# Patient Record
Sex: FEMALE | Race: WHITE | NOT HISPANIC OR LATINO | Employment: PART TIME | URBAN - METROPOLITAN AREA
[De-identification: names, ages, dates, MRNs, and addresses within clinical notes are randomized per-mention and may not be internally consistent; named-entity substitution may affect disease eponyms.]

---

## 2022-03-20 ENCOUNTER — APPOINTMENT (OUTPATIENT)
Dept: RADIOLOGY | Facility: MEDICAL CENTER | Age: 22
End: 2022-03-20
Attending: EMERGENCY MEDICINE
Payer: MEDICAID

## 2022-03-20 ENCOUNTER — APPOINTMENT (OUTPATIENT)
Dept: RADIOLOGY | Facility: MEDICAL CENTER | Age: 22
End: 2022-03-20
Attending: SURGERY
Payer: MEDICAID

## 2022-03-20 ENCOUNTER — HOSPITAL ENCOUNTER (EMERGENCY)
Facility: MEDICAL CENTER | Age: 22
End: 2022-03-20
Attending: EMERGENCY MEDICINE
Payer: MEDICAID

## 2022-03-20 VITALS
OXYGEN SATURATION: 94 % | BODY MASS INDEX: 22.1 KG/M2 | WEIGHT: 117.06 LBS | RESPIRATION RATE: 16 BRPM | TEMPERATURE: 98.1 F | SYSTOLIC BLOOD PRESSURE: 105 MMHG | HEIGHT: 61 IN | DIASTOLIC BLOOD PRESSURE: 59 MMHG | HEART RATE: 75 BPM

## 2022-03-20 DIAGNOSIS — R10.30 LOWER ABDOMINAL PAIN: ICD-10-CM

## 2022-03-20 LAB
ALBUMIN SERPL BCP-MCNC: 5.6 G/DL (ref 3.2–4.9)
ALBUMIN/GLOB SERPL: 1.7 G/DL
ALP SERPL-CCNC: 67 U/L (ref 30–99)
ALT SERPL-CCNC: 18 U/L (ref 2–50)
ANION GAP SERPL CALC-SCNC: 17 MMOL/L (ref 7–16)
APPEARANCE UR: CLEAR
AST SERPL-CCNC: 18 U/L (ref 12–45)
BACTERIA #/AREA URNS HPF: ABNORMAL /HPF
BASOPHILS # BLD AUTO: 0.4 % (ref 0–1.8)
BASOPHILS # BLD: 0.07 K/UL (ref 0–0.12)
BILIRUB SERPL-MCNC: 0.4 MG/DL (ref 0.1–1.5)
BILIRUB UR QL STRIP.AUTO: NEGATIVE
BUN SERPL-MCNC: 15 MG/DL (ref 8–22)
CALCIUM SERPL-MCNC: 10.8 MG/DL (ref 8.5–10.5)
CHLORIDE SERPL-SCNC: 102 MMOL/L (ref 96–112)
CO2 SERPL-SCNC: 19 MMOL/L (ref 20–33)
COLOR UR: YELLOW
CREAT SERPL-MCNC: 0.81 MG/DL (ref 0.5–1.4)
EOSINOPHIL # BLD AUTO: 0.02 K/UL (ref 0–0.51)
EOSINOPHIL NFR BLD: 0.1 % (ref 0–6.9)
EPI CELLS #/AREA URNS HPF: NEGATIVE /HPF
ERYTHROCYTE [DISTWIDTH] IN BLOOD BY AUTOMATED COUNT: 44.4 FL (ref 35.9–50)
GFR SERPLBLD CREATININE-BSD FMLA CKD-EPI: 106 ML/MIN/1.73 M 2
GLOBULIN SER CALC-MCNC: 3.3 G/DL (ref 1.9–3.5)
GLUCOSE SERPL-MCNC: 113 MG/DL (ref 65–99)
GLUCOSE UR STRIP.AUTO-MCNC: NEGATIVE MG/DL
HCG SERPL QL: NEGATIVE
HCT VFR BLD AUTO: 45.8 % (ref 37–47)
HGB BLD-MCNC: 15.2 G/DL (ref 12–16)
HYALINE CASTS #/AREA URNS LPF: ABNORMAL /LPF
IMM GRANULOCYTES # BLD AUTO: 0.07 K/UL (ref 0–0.11)
IMM GRANULOCYTES NFR BLD AUTO: 0.4 % (ref 0–0.9)
KETONES UR STRIP.AUTO-MCNC: 15 MG/DL
LEUKOCYTE ESTERASE UR QL STRIP.AUTO: ABNORMAL
LIPASE SERPL-CCNC: 34 U/L (ref 11–82)
LYMPHOCYTES # BLD AUTO: 1.67 K/UL (ref 1–4.8)
LYMPHOCYTES NFR BLD: 9.5 % (ref 22–41)
MCH RBC QN AUTO: 30 PG (ref 27–33)
MCHC RBC AUTO-ENTMCNC: 33.2 G/DL (ref 33.6–35)
MCV RBC AUTO: 90.3 FL (ref 81.4–97.8)
MICRO URNS: ABNORMAL
MONOCYTES # BLD AUTO: 0.73 K/UL (ref 0–0.85)
MONOCYTES NFR BLD AUTO: 4.2 % (ref 0–13.4)
NEUTROPHILS # BLD AUTO: 14.97 K/UL (ref 2–7.15)
NEUTROPHILS NFR BLD: 85.4 % (ref 44–72)
NITRITE UR QL STRIP.AUTO: NEGATIVE
NRBC # BLD AUTO: 0 K/UL
NRBC BLD-RTO: 0 /100 WBC
PH UR STRIP.AUTO: 6.5 [PH] (ref 5–8)
PLATELET # BLD AUTO: 346 K/UL (ref 164–446)
PMV BLD AUTO: 9.8 FL (ref 9–12.9)
POTASSIUM SERPL-SCNC: 3.8 MMOL/L (ref 3.6–5.5)
PROT SERPL-MCNC: 8.9 G/DL (ref 6–8.2)
PROT UR QL STRIP: NEGATIVE MG/DL
RBC # BLD AUTO: 5.07 M/UL (ref 4.2–5.4)
RBC # URNS HPF: ABNORMAL /HPF
RBC UR QL AUTO: ABNORMAL
SARS-COV+SARS-COV-2 AG RESP QL IA.RAPID: NOTDETECTED
SODIUM SERPL-SCNC: 138 MMOL/L (ref 135–145)
SP GR UR STRIP.AUTO: >=1.045
SPECIMEN SOURCE: NORMAL
UROBILINOGEN UR STRIP.AUTO-MCNC: 0.2 MG/DL
WBC # BLD AUTO: 17.5 K/UL (ref 4.8–10.8)
WBC #/AREA URNS HPF: ABNORMAL /HPF

## 2022-03-20 PROCEDURE — 700105 HCHG RX REV CODE 258: Performed by: EMERGENCY MEDICINE

## 2022-03-20 PROCEDURE — 36415 COLL VENOUS BLD VENIPUNCTURE: CPT

## 2022-03-20 PROCEDURE — 87426 SARSCOV CORONAVIRUS AG IA: CPT

## 2022-03-20 PROCEDURE — 85025 COMPLETE CBC W/AUTO DIFF WBC: CPT

## 2022-03-20 PROCEDURE — 96375 TX/PRO/DX INJ NEW DRUG ADDON: CPT

## 2022-03-20 PROCEDURE — 96376 TX/PRO/DX INJ SAME DRUG ADON: CPT

## 2022-03-20 PROCEDURE — 76705 ECHO EXAM OF ABDOMEN: CPT

## 2022-03-20 PROCEDURE — 74177 CT ABD & PELVIS W/CONTRAST: CPT

## 2022-03-20 PROCEDURE — 700117 HCHG RX CONTRAST REV CODE 255: Performed by: EMERGENCY MEDICINE

## 2022-03-20 PROCEDURE — 99243 OFF/OP CNSLTJ NEW/EST LOW 30: CPT | Performed by: SURGERY

## 2022-03-20 PROCEDURE — 80053 COMPREHEN METABOLIC PANEL: CPT

## 2022-03-20 PROCEDURE — 81001 URINALYSIS AUTO W/SCOPE: CPT

## 2022-03-20 PROCEDURE — 99285 EMERGENCY DEPT VISIT HI MDM: CPT

## 2022-03-20 PROCEDURE — 700111 HCHG RX REV CODE 636 W/ 250 OVERRIDE (IP): Performed by: EMERGENCY MEDICINE

## 2022-03-20 PROCEDURE — 83690 ASSAY OF LIPASE: CPT

## 2022-03-20 PROCEDURE — 84703 CHORIONIC GONADOTROPIN ASSAY: CPT

## 2022-03-20 PROCEDURE — 76856 US EXAM PELVIC COMPLETE: CPT

## 2022-03-20 PROCEDURE — 96374 THER/PROPH/DIAG INJ IV PUSH: CPT

## 2022-03-20 RX ORDER — SODIUM CHLORIDE, SODIUM LACTATE, POTASSIUM CHLORIDE, CALCIUM CHLORIDE 600; 310; 30; 20 MG/100ML; MG/100ML; MG/100ML; MG/100ML
1000 INJECTION, SOLUTION INTRAVENOUS ONCE
Status: COMPLETED | OUTPATIENT
Start: 2022-03-20 | End: 2022-03-20

## 2022-03-20 RX ORDER — KETOROLAC TROMETHAMINE 30 MG/ML
15 INJECTION, SOLUTION INTRAMUSCULAR; INTRAVENOUS ONCE
Status: COMPLETED | OUTPATIENT
Start: 2022-03-20 | End: 2022-03-20

## 2022-03-20 RX ORDER — ONDANSETRON 2 MG/ML
4 INJECTION INTRAMUSCULAR; INTRAVENOUS ONCE
Status: COMPLETED | OUTPATIENT
Start: 2022-03-20 | End: 2022-03-20

## 2022-03-20 RX ORDER — IBUPROFEN 800 MG/1
800 TABLET ORAL EVERY 8 HOURS PRN
COMMUNITY

## 2022-03-20 RX ORDER — HYDROXYZINE HYDROCHLORIDE 25 MG/1
25 TABLET, FILM COATED ORAL
COMMUNITY

## 2022-03-20 RX ORDER — NITROFURANTOIN 25; 75 MG/1; MG/1
100 CAPSULE ORAL 2 TIMES DAILY
Qty: 14 CAPSULE | Refills: 0 | Status: SHIPPED | OUTPATIENT
Start: 2022-03-20 | End: 2022-03-27

## 2022-03-20 RX ORDER — KETOROLAC TROMETHAMINE 30 MG/ML
30 INJECTION, SOLUTION INTRAMUSCULAR; INTRAVENOUS ONCE
Status: COMPLETED | OUTPATIENT
Start: 2022-03-20 | End: 2022-03-20

## 2022-03-20 RX ORDER — ONDANSETRON 4 MG/1
4 TABLET, ORALLY DISINTEGRATING ORAL EVERY 6 HOURS PRN
COMMUNITY

## 2022-03-20 RX ADMIN — KETOROLAC TROMETHAMINE 15 MG: 30 INJECTION, SOLUTION INTRAMUSCULAR at 08:51

## 2022-03-20 RX ADMIN — IOHEXOL 100 ML: 350 INJECTION, SOLUTION INTRAVENOUS at 06:13

## 2022-03-20 RX ADMIN — ONDANSETRON 4 MG: 2 INJECTION INTRAMUSCULAR; INTRAVENOUS at 05:00

## 2022-03-20 RX ADMIN — KETOROLAC TROMETHAMINE 30 MG: 30 INJECTION, SOLUTION INTRAMUSCULAR at 05:35

## 2022-03-20 RX ADMIN — SODIUM CHLORIDE, POTASSIUM CHLORIDE, SODIUM LACTATE AND CALCIUM CHLORIDE 1000 ML: 600; 310; 30; 20 INJECTION, SOLUTION INTRAVENOUS at 05:03

## 2022-03-20 ASSESSMENT — PAIN DESCRIPTION - PAIN TYPE: TYPE: ACUTE PAIN

## 2022-03-20 NOTE — ED NOTES
Med Rec complete per Pt and family at bedside.  Allergies reviewed.    Home Pharmacy:  [facility]

## 2022-03-20 NOTE — ED PROVIDER NOTES
"ED Provider Note    CHIEF COMPLAINT  Chief Complaint   Patient presents with   • Abdominal Pain         [facility]  Sahraa Dorothy Samuels is a 21 y.o. female who presents with abdominal pain.  Patient has a history of recurrent abdominal pain over the last year.  Pain occurs daily and usually last about 30 minutes at a time.  She thinks that it prompted by eating greasy or fatty foods.  She ate some tacos last night.  She woke up at midnight with pain of same character as previous, but this has not gone away.  The pain waxes and wanes from a severity of 10-8 and back to 10.  Characterized as sharp.  No modifying factors.  She has been having bowel movements.  Patient is currently menstruating.  Denies pregnancy.  She denies dysuria or hematuria.    REVIEW OF SYSTEMS  As per HPI  All other systems are negative.     PAST MEDICAL HISTORY  Past Medical History:   Diagnosis Date   • Endometriosis    • Ovarian cyst        FAMILY HISTORY  History reviewed. No pertinent family history.    SOCIAL HISTORY  Social History     Tobacco Use   • Smoking status: Never Smoker   Substance Use Topics   • Alcohol use: Yes   • Drug use: Yes     Types: Inhaled     Comment: marijuana       SURGICAL HISTORY  History reviewed. No pertinent surgical history.    CURRENT MEDICATIONS  Home Medications     Reviewed by Areli Brown R.N. (Registered Nurse) on 03/20/22 at 0415  Med List Status: <None>   Medication Last Dose Status        Patient Meng Taking any Medications                       ALLERGIES  Allergies   Allergen Reactions   • Other Drug      Unknown antibiotic       PHYSICAL EXAM  VITAL SIGNS: /67   Pulse 87   Temp 36.4 °C (97.6 °F) (Temporal)   Resp 18   Ht 1.549 m (5' 1\")   Wt 53.1 kg (117 lb 1 oz)   LMP 03/07/2022   SpO2 99%   BMI 22.12 kg/m²   Constitutional: Awake and alert.  Sitting upright holding her abdomen appears to be in pain  HENT: Dry mucous membranes  Eyes: Sclera white  Neck: Normal range of " motion  Cardiovascular: Normal heart rate, Normal rhythm  Thorax & Lungs: Normal breath sounds, No respiratory distress, No wheezing, No chest tenderness.   Abdomen: Tender to palpation across the lower abdomen.  Tenderness slightly worse on the right than the left no distention or tympany.  No rebound or peritonitis  Skin: No rash.   Back: No tenderness, No CVA tenderness.   Extremities: Intact, symmetric distal pulses, no edema.  Neurologic: Grossly normal    RADIOLOGY/PROCEDURES  US-RUQ   Final Result      No abnormalities are identified on ultrasound right upper quadrant abdomen.      CT-ABDOMEN-PELVIS WITH   Final Result      1.  Diameter of the appendix is at the upper limits of normal without other findings to suggest acute appendicitis. No significant periappendiceal stranding, abscess, or pneumoperitoneum      2.  Small amount of nonspecific free pelvic fluid.      US-PELVIC COMPLETE (TRANSABDOMINAL/TRANSVAGINAL) (COMBO)   Final Result      1. Heterogeneous endometrial complex is likely related to complex fluid/blood related to menses. If clinically indicated, consider follow-up imaging.      2. Small amount of nonspecific free pelvic fluid.         Imaging is interpreted by radiologist    Labs:   Results for orders placed or performed during the hospital encounter of 03/20/22   CBC WITH DIFFERENTIAL   Result Value Ref Range    WBC 17.5 (H) 4.8 - 10.8 K/uL    RBC 5.07 4.20 - 5.40 M/uL    Hemoglobin 15.2 12.0 - 16.0 g/dL    Hematocrit 45.8 37.0 - 47.0 %    MCV 90.3 81.4 - 97.8 fL    MCH 30.0 27.0 - 33.0 pg    MCHC 33.2 (L) 33.6 - 35.0 g/dL    RDW 44.4 35.9 - 50.0 fL    Platelet Count 346 164 - 446 K/uL    MPV 9.8 9.0 - 12.9 fL    Neutrophils-Polys 85.40 (H) 44.00 - 72.00 %    Lymphocytes 9.50 (L) 22.00 - 41.00 %    Monocytes 4.20 0.00 - 13.40 %    Eosinophils 0.10 0.00 - 6.90 %    Basophils 0.40 0.00 - 1.80 %    Immature Granulocytes 0.40 0.00 - 0.90 %    Nucleated RBC 0.00 /100 WBC    Neutrophils (Absolute)  14.97 (H) 2.00 - 7.15 K/uL    Lymphs (Absolute) 1.67 1.00 - 4.80 K/uL    Monos (Absolute) 0.73 0.00 - 0.85 K/uL    Eos (Absolute) 0.02 0.00 - 0.51 K/uL    Baso (Absolute) 0.07 0.00 - 0.12 K/uL    Immature Granulocytes (abs) 0.07 0.00 - 0.11 K/uL    NRBC (Absolute) 0.00 K/uL   COMP METABOLIC PANEL   Result Value Ref Range    Sodium 138 135 - 145 mmol/L    Potassium 3.8 3.6 - 5.5 mmol/L    Chloride 102 96 - 112 mmol/L    Co2 19 (L) 20 - 33 mmol/L    Anion Gap 17.0 (H) 7.0 - 16.0    Glucose 113 (H) 65 - 99 mg/dL    Bun 15 8 - 22 mg/dL    Creatinine 0.81 0.50 - 1.40 mg/dL    Calcium 10.8 (H) 8.5 - 10.5 mg/dL    AST(SGOT) 18 12 - 45 U/L    ALT(SGPT) 18 2 - 50 U/L    Alkaline Phosphatase 67 30 - 99 U/L    Total Bilirubin 0.4 0.1 - 1.5 mg/dL    Albumin 5.6 (H) 3.2 - 4.9 g/dL    Total Protein 8.9 (H) 6.0 - 8.2 g/dL    Globulin 3.3 1.9 - 3.5 g/dL    A-G Ratio 1.7 g/dL   URINALYSIS CULTURE, IF INDICATED    Specimen: Urine   Result Value Ref Range    Color Yellow     Character Clear     Specific Gravity >=1.045 (A) <1.035    Ph 6.5 5.0 - 8.0    Glucose Negative Negative mg/dL    Ketones 15 (A) Negative mg/dL    Protein Negative Negative mg/dL    Bilirubin Negative Negative    Urobilinogen, Urine 0.2 Negative    Nitrite Negative Negative    Leukocyte Esterase Trace (A) Negative    Occult Blood Moderate (A) Negative    Micro Urine Req Microscopic    HCG QUAL SERUM   Result Value Ref Range    Beta-Hcg Qualitative Serum Negative Negative   LIPASE   Result Value Ref Range    Lipase 34 11 - 82 U/L   ESTIMATED GFR   Result Value Ref Range    GFR (CKD-EPI) 106 >60 mL/min/1.73 m 2   SARS-COV Antigen ALVARADO   Result Value Ref Range    SARS-CoV-2 Source Nasal Swab     SARS-COV ANTIGEN ALVARADO NotDetected NotDetected   URINE MICROSCOPIC (W/UA)   Result Value Ref Range    WBC 5-10 (A) /hpf    RBC 2-5 (A) /hpf    Bacteria Moderate (A) None /hpf    Epithelial Cells Negative /hpf    Hyaline Cast 0-2 /lpf       Medications   ondansetron (ZOFRAN)  syringe/vial injection 4 mg (4 mg Intravenous Given 3/20/22 0500)   LR infusion (bolus) (1,000 mL Intravenous New Bag 3/20/22 0503)   ketorolac (TORADOL) injection 30 mg (30 mg Intravenous Given 3/20/22 5196)   iohexol (OMNIPAQUE) 350 mg/mL (IV) (100 mL Intravenous Given 3/20/22 0695)       Measures:  -Hydration: Patient was given IV fluids because of possible dehydration.  Oral fluids were not appropriate because of a possible surgical problem.  On recheck the patient was stable    COURSE & MEDICAL DECISION MAKING  Patient presents with abdominal pain.  This is across her lower abdomen.  History of recurrent abdominal pain, endometriosis and ovarian cysts.  Work-up is initiated.  Treat with Zofran and Toradol.    Initially order ultrasound given reports of vaginal bleeding associated with abdominal pain.  This shows a thickened endometrium, but no large ovarian cyst to explain the severity of her symptoms.  WBC count returns elevated.  CT scan was ordered.    CT scan demonstrates dilated appendix without secondary signs of appendicitis.  Patient was reassessed.  Reports she feels much better.  Abdomen is reexamined with minimal if any tenderness in the right lower quadrant.  Consult general surgery for opinion.    I discussed case with Dr. GARDNER who will evaluate the patient in the emergency department.    Patient was seen by Dr. Farley.  He ordered right upper quadrant ultrasound.  Does not believe that this clearly represents appendicitis at this time and recommended observation at home with return ER evaluation for persistent pain if ultrasound was negative.    Ultrasound right upper quadrant was negative.  Patient remained stable.  Vitals are normal.  No fever.  No focal right lower quadrant abdominal tenderness on repeat exam.  Urinalysis did demonstrate a few bacteria and she will be covered with Macrobid although I doubt this is the cause of her symptoms.  Suspect most likely endometriosis associated with  her menstruation.  She will take Tylenol and ibuprofen as needed for pain.  I precautioned her to return to the ER if she has any persistent pain in 12 to 24 hours.    FINAL IMPRESSION  1.  Lower abdominal pain        This dictation was created using voice recognition software. The accuracy of the dictation is limited to the abilities of the software.  The nursing notes were reviewed and certain aspects of this information were incorporated into this note.    Electronically signed by: Brett Banegas M.D., 3/20/2022 4:48 AM

## 2022-03-20 NOTE — ED NOTES
Received report from Sary LATHAM RN. Assumed pt care. Pt resting in [facility], call light within reach

## 2022-03-20 NOTE — ED TRIAGE NOTES
Scarlet Samuels  21 y.o. female  Chief Complaint   Patient presents with   • Abdominal Pain     Patient arrives with complaints of severe low pelvic pain that began at 0000 and woke pt from sleep. Denies dysuria/vaginal discharge/diarrhea/fevers. Pt is currently on menstrual cycle. Pain 10/10    Pt vomited today but states it is not unusual for her.      Vitals:    03/20/22 0356   BP: 119/67   Pulse: 87   Resp: 18   Temp: 36.4 °C (97.6 °F)   SpO2: 99%       Triage process explained to patient, apologized for wait time, and returned to lobby.  Pt informed to notify staff of any change in condition.

## 2022-03-20 NOTE — H&P
Surgical History and Physical    Date of Service: 3/20/2022    Requesting Physician: Brett Banegas MD - Er    Reason for Consultation: Abdominal pain, dilated appendix    HPI: This is a 21 y.o. female who is presenting with relapsing and remitting abdominal pain for the past year.  The pain is typically pelvic, RLQ, LLQ and varies among those locations.  The current episode of pain woke her from sleep around midnight.  She reports getting the pain after she eats in one or more of these locations.  She has associated nausea and emesis and she states this typically alleviates the pain.  She has never been worked up for this before.  She was given a dose of IV Toradol by Dr. Banegas and this alleviated some of her pain.  Work up included a CT abd/pel that showed a mildly dilated appendix with no surrounding inflammation.  Her WBC is 17.  She is afebrile.      I evaluated the patient at bedside.  She reports ongoing abdominal pain at the pelvic and LLQ locations.  No nausea at this time.      PAST MEDICAL HISTORY:   Past Medical History:   Diagnosis Date   • Endometriosis    • Ovarian cyst          PAST SURGICAL HISTORY: History reviewed. No pertinent surgical history.       ALLERGIES: Other drug       CURRENT MEDICATIONS:   Outpatient Medications Marked as Taking for the 3/20/22 encounter (Hospital Encounter)   Medication Sig   • Sertraline HCl (ZOLOFT PO) Take 1 Tablet by mouth every day.   • ondansetron (ZOFRAN ODT) 4 MG TABLET DISPERSIBLE Take 4 mg by mouth every 6 hours as needed for Nausea.   • hydrOXYzine HCl (ATARAX) 25 MG Tab Take 25 mg by mouth at bedtime.   • ibuprofen (MOTRIN) 800 MG Tab Take 800 mg by mouth every 8 hours as needed.   • nitrofurantoin (MACROBID) 100 MG Cap Take 1 Capsule by mouth 2 times a day for 7 days.         FAMILY HISTORY:   Reviewed and found to be non-contributory in regards to the above presentation    SOCIAL HISTORY:  reports that she has never smoked. She does not have any  "smokeless tobacco history on file. She reports current alcohol use. She reports current drug use. Drug: Inhaled.      Review of Systems:  Constitutional: Negative for fever, chills, weight loss, malaise/fatigue and diaphoresis.   HENT: Negative for hearing loss, ear pain, nosebleeds, congestion, sore throat, neck pain, tinnitus and ear discharge.    Eyes: Negative for blurred vision, double vision, photophobia, pain, discharge and redness.   Respiratory: Negative for cough, hemoptysis, sputum production, shortness of breath, wheezing and stridor.    Cardiovascular: Negative for chest pain, palpitations, orthopnea, claudication, leg swelling and PND.   Gastrointestinal: Negative for heartburn, nausea, vomiting, abdominal pain, diarrhea, constipation, blood in stool and melena.   Genitourinary: Negative for dysuria, urgency, frequency, hematuria and flank pain.   Musculoskeletal: Negative for myalgias, back pain, joint pain and falls.   Skin: Negative for itching and rash.  Neurological: Negative for dizziness, tingling, tremors, sensory change, speech change, focal weakness, seizures, loss of consciousness, weakness and headaches.   Endo/Heme/Allergies: Negative for environmental allergies and polydipsia. Does not bruise/bleed easily.   Psychiatric/Behavioral: Negative for depression, suicidal ideas, hallucinations, memory loss and substance abuse. The patient is not nervous/anxious and does not have insomnia.    Physical Exam:  /59   Pulse 75   Temp 36.7 °C (98.1 °F)   Resp 16   Ht 1.549 m (5' 1\")   Wt 53.1 kg (117 lb 1 oz)   SpO2 94%   Vitals:    03/20/22 1000   BP: 105/59   Pulse: 75   Resp: 16   Temp: 36.7 °C (98.1 °F)   SpO2: 94%     GENERAL:  Otherwise healthy-appearing and in no acute distress  HEENT:  Atraumatic, normocephalic.  Normal pinna bilaterally.  External auditory canals are without discharge.  Conjunctivae and sclerae are clear. Extraocular movements are full. Pupils are equal, round, " and reactive to light.  Oral mucosa is moist.  NECK:  Soft and supple without lymphadenopathy. No masses are noted.  Thyroid is of normal size and texture.  Trachea is midline.  CHEST:  Lungs are clear to auscultation bilaterally.  No masses, lesions, or signs of trauma were noted.     CARDIOVASCULAR:  Regular rate and rhythm.  No murmurs appreciated.  No JVD.  Palpable pulses present in all four extremities.    ABDOMEN:  Soft, moderately tender with palpation of the LLQ, nontender at the suprapubic and RLQ locations, no Michaud's sign, non-distended.  Non-tympanitic.  No hepatomegaly or splenomegaly.  No incisional, umbilical, or inguinal hernias were appreciated.  GENITOURINARY:  Normal external reproductive anatomy.  MUSCULOSKELETAL: Normal range of motion x4 extremities.    SKIN:  Warm and well perfused. No rashes.  NEUROLOGIC:  Alert and oriented. Cranial nerves II through XII are grossly intact. Motor and sensory exams are normal in all four extremities. Motor and sensory reflexes are 2+ and symmetric with bilateral plantar responses.  PSYCHIATRIC: Affect and mood is appropriate for age and condition.    Labs:  Recent Labs     03/20/22  0457   WBC 17.5*   RBC 5.07   HEMOGLOBIN 15.2   HEMATOCRIT 45.8   MCV 90.3   MCH 30.0   MCHC 33.2*   RDW 44.4   PLATELETCT 346   MPV 9.8     Recent Labs     03/20/22  0457   SODIUM 138   POTASSIUM 3.8   CHLORIDE 102   CO2 19*   GLUCOSE 113*   BUN 15   CREATININE 0.81   CALCIUM 10.8*         Recent Labs     03/20/22  0457   ASTSGOT 18   ALTSGPT 18   TBILIRUBIN 0.4   ALKPHOSPHAT 67   GLOBULIN 3.3       Radiology:  US-RUQ   Final Result      No abnormalities are identified on ultrasound right upper quadrant abdomen.      CT-ABDOMEN-PELVIS WITH   Final Result      1.  Diameter of the appendix is at the upper limits of normal without other findings to suggest acute appendicitis. No significant periappendiceal stranding, abscess, or pneumoperitoneum      2.  Small amount of  nonspecific free pelvic fluid.      US-PELVIC COMPLETE (TRANSABDOMINAL/TRANSVAGINAL) (COMBO)   Final Result      1. Heterogeneous endometrial complex is likely related to complex fluid/blood related to menses. If clinically indicated, consider follow-up imaging.      2. Small amount of nonspecific free pelvic fluid.          Assessment/Plan:   1) Abdominal Pain  2) Appendiceal Dilation  3) Leukocytosis    This would be an atypical presentation for acute appendicitis.  The CT is equivocal.  It could be early, though I would not expect her WBC to be as elevated as it is.  Given the history of oral intake causing the pain, I ordered a RUQ ultrasound which did not show any significant abnormalities.  Discussed with Dr. Banegas.  She is presently menstruating and has a history of endometriosis.  This could be the source of her pain syndrome.      -Return to ER in 12-24 hours for a new CBC and recheck by the ER MD in the event she has an evolving appendicitis  -Consider HIDA scan to further rule out gallbladder dysfunction as a source of her pain if clinical suspicion continues    I independently reviewed pertinent clinical lab tests since admission and ordered additional follow up clinical lab tests.  I independently reviewed pertinent radiographic images and the radiologist's reports since admission and ordered additional follow up radiographic studies.  The details of the available patient records in [facility] (including laboratory tests, culture data, medications, imaging, and other pertinent diagnostic tests) and that information was utilized as warranted in today's medical decision making for this patient.  I personally evaluated the patient condition at bedside.    Care interventions include:   review of interval medical and surgical history, review of current medications and outpatient medication reconciliation and review of interval imaging studies and radiologist interpretation    Aggregated care time spent  evaluating, reassessing, reviewing documentation, providing care, and managing this patient exclusive of procedures: 55 minutes  ____________________________________   Ac Farley MD, FACS   JRU / NTS     DD: 3/20/2022   DT: 11:44 AM